# Patient Record
Sex: MALE | Race: WHITE
[De-identification: names, ages, dates, MRNs, and addresses within clinical notes are randomized per-mention and may not be internally consistent; named-entity substitution may affect disease eponyms.]

---

## 2019-06-20 ENCOUNTER — HOSPITAL ENCOUNTER (OUTPATIENT)
Dept: HOSPITAL 33 - SDC | Age: 49
Discharge: HOME | End: 2019-06-20
Attending: SURGERY
Payer: COMMERCIAL

## 2019-06-20 VITALS — HEART RATE: 59 BPM | DIASTOLIC BLOOD PRESSURE: 62 MMHG | SYSTOLIC BLOOD PRESSURE: 142 MMHG

## 2019-06-20 VITALS — OXYGEN SATURATION: 98 %

## 2019-06-20 DIAGNOSIS — K40.90: Primary | ICD-10-CM

## 2019-06-20 DIAGNOSIS — I10: ICD-10-CM

## 2019-06-20 DIAGNOSIS — E78.00: ICD-10-CM

## 2019-06-20 PROCEDURE — 49505 PRP I/HERN INIT REDUC >5 YR: CPT

## 2019-06-20 NOTE — HP
DATE OF SURGERY:  06/20/2019 



ADMISSION DIAGNOSIS:  Left inguinal hernia. 



ANTICIPATED PROCEDURE:  Repair.



HISTORY OF PRESENT ILLNESS:  The patient has moderate sized symptomatic left inguinal 
hernia.  



PAST MEDICAL HISTORY: 

ALLERGIES: NONE.

MEDICATIONS: Metoprolol, atorvastatin. 



PAST SURGICAL HISTORY:  Eardrum replacement. 



SOCIAL HISTORY: Negative.  

FAMILY HISTORY: Negative.



REVIEW OF SYSTEMS:  Hypertension, cholesterol. 



PHYSICAL EXAMINATION:  

VITAL SIGNS: Normal.

CHEST: Clear.

COR: Regular.

ABDOMEN: Left inguinal hernia. 



IMPRESSION: Symptomatic left inguinal hernia. 



PLAN:  Repair.

## 2019-06-21 NOTE — OP
SURGERY DATE/TIME:  06/20/2019  1334      



PREOPERATIVE DIAGNOSIS:     Symptomatic left inguinal hernia. 



POSTOPERATIVE DIAGNOSIS:    Symptomatic left inguinal hernia. 



PROCEDURE:    Left inguinal herniorrhaphy with mesh. 



SURGEON:        August Hagen M.D.



ANESTHESIA:    General.



COMPLICATIONS:    None.



CONDITION:        Stable.



INDICATION:  The patient with symptomatic left inguinal hernia. 



DESCRIPTION OF PROCEDURE: Taken to surgery. General anesthetic. Routine prep and drape. 
Kidney rest was up. 0.25% Marcaine. Time out performed. Curvilinear incision.  External 
oblique opened. A 3 inch indirect hernia sac. There was incarcerated omentum. A band was 
cut. The omentum reduced. High ligation of sac with 0 Prolene under direct visualization. 
The floor was reinforced with 1 x 4 mesh Judson's ligament type fashion with 0 Prolene 
making sure not to entrap any underlying nerve fibers. Internal ring was one clamp tight. 
Cord, ilioinguinal nerve laid back in natural position. External oblique closed with 0 
Vicryl.  Risa fascia closed with 2-0 Vicryl. Skin closed 4-0 Vicryl. Sterile dressing 
applied. The patient tolerated the procedure satisfactorily.

## 2020-10-26 ENCOUNTER — HOSPITAL ENCOUNTER (EMERGENCY)
Dept: HOSPITAL 33 - ED | Age: 50
Discharge: HOME | End: 2020-10-26
Payer: COMMERCIAL

## 2020-10-26 VITALS — HEART RATE: 84 BPM | SYSTOLIC BLOOD PRESSURE: 103 MMHG | DIASTOLIC BLOOD PRESSURE: 68 MMHG

## 2020-10-26 VITALS — OXYGEN SATURATION: 98 %

## 2020-10-26 DIAGNOSIS — R06.00: ICD-10-CM

## 2020-10-26 DIAGNOSIS — L50.9: Primary | ICD-10-CM

## 2020-10-26 LAB
ALBUMIN SERPL-MCNC: 3.9 G/DL (ref 3.5–5)
ALP SERPL-CCNC: 92 U/L (ref 38–126)
ALT SERPL-CCNC: 26 U/L (ref 0–50)
ANION GAP SERPL CALC-SCNC: 9.4 MEQ/L (ref 5–15)
AST SERPL QL: 26 U/L (ref 17–59)
BASOPHILS # BLD AUTO: 0 10*3/UL (ref 0–0.4)
BASOPHILS NFR BLD AUTO: 0 % (ref 0–0.4)
BILIRUB BLD-MCNC: 1.2 MG/DL (ref 0.2–1.3)
BNP SERPL-MCNC: 21.6 PG/ML (ref 0–900)
BUN SERPL-MCNC: 15 MG/DL (ref 9–20)
CALCIUM SPEC-MCNC: 8.3 MG/DL (ref 8.4–10.2)
CHLORIDE SERPL-SCNC: 109 MMOL/L (ref 98–107)
CO2 SERPL-SCNC: 24 MMOL/L (ref 22–30)
CREAT SERPL-MCNC: 0.74 MG/DL (ref 0.66–1.25)
EOSINOPHIL # BLD AUTO: 0.32 10*3/UL (ref 0–0.5)
GFR SERPLBLD BASED ON 1.73 SQ M-ARVRAT: > 60 ML/MIN
GLUCOSE SERPL-MCNC: 153 MG/DL (ref 74–106)
HCT VFR BLD AUTO: 43.5 % (ref 42–50)
HGB BLD-MCNC: 14.4 GM/DL (ref 12.5–18)
LYMPHOCYTES # SPEC AUTO: 1.7 10*3/UL (ref 1–4.6)
MAGNESIUM SERPL-MCNC: 1.9 MG/DL (ref 1.6–2.3)
MCH RBC QN AUTO: 30.4 PG (ref 26–32)
MCHC RBC AUTO-ENTMCNC: 33.1 G/DL (ref 32–36)
MONOCYTES # BLD AUTO: 0.61 10*3/UL (ref 0–1.3)
PLATELET # BLD AUTO: 205 K/MM3 (ref 150–450)
POTASSIUM SERPLBLD-SCNC: 3.5 MMOL/L (ref 3.5–5.1)
PROT SERPL-MCNC: 6.5 G/DL (ref 6.3–8.2)
RBC # BLD AUTO: 4.73 M/MM3 (ref 4.1–5.6)
SODIUM SERPL-SCNC: 139 MMOL/L (ref 137–145)
WBC # BLD AUTO: 8.8 K/MM3 (ref 4–10.5)

## 2020-10-26 PROCEDURE — 85379 FIBRIN DEGRADATION QUANT: CPT

## 2020-10-26 PROCEDURE — 93041 RHYTHM ECG TRACING: CPT

## 2020-10-26 PROCEDURE — 71046 X-RAY EXAM CHEST 2 VIEWS: CPT

## 2020-10-26 PROCEDURE — 83735 ASSAY OF MAGNESIUM: CPT

## 2020-10-26 PROCEDURE — 83880 ASSAY OF NATRIURETIC PEPTIDE: CPT

## 2020-10-26 PROCEDURE — 85025 COMPLETE CBC W/AUTO DIFF WBC: CPT

## 2020-10-26 PROCEDURE — 93005 ELECTROCARDIOGRAM TRACING: CPT

## 2020-10-26 PROCEDURE — 94760 N-INVAS EAR/PLS OXIMETRY 1: CPT

## 2020-10-26 PROCEDURE — 80053 COMPREHEN METABOLIC PANEL: CPT

## 2020-10-26 PROCEDURE — 84484 ASSAY OF TROPONIN QUANT: CPT

## 2020-10-26 PROCEDURE — 36415 COLL VENOUS BLD VENIPUNCTURE: CPT

## 2020-10-26 PROCEDURE — 71260 CT THORAX DX C+: CPT

## 2020-10-26 PROCEDURE — 99284 EMERGENCY DEPT VISIT MOD MDM: CPT

## 2020-10-26 NOTE — XRAY
Indication: Allergic reaction.  Hives.  Difficulty breathing.



Comparison: None



PA/lateral chest demonstrates normal heart, lungs, and bony thorax. 12-Feb-2018

## 2020-10-26 NOTE — ERPHSYRPT
- History of Present Illness


Time Seen by Provider: 10/26/20 00:06


Source: patient


Exam Limitations: no limitations


Physician History: 





About 1 hour ago pt started itching and erupted in hives all over with shortness

of air. Pt denies chest pain, vomiting, abdominal pain, nausea. EMS states they 

gave 125mg solumedrol IV.


Allergies/Adverse Reactions: 








No Known Drug Allergies Allergy (Unverified 06/20/19 11:20)


   





Home Medications: 








Aspirin EC 81 mg*** [Ecotrin 81 mg***] 81 mg PO DAILY 06/20/19 [History]


Atorvastatin Calcium [Lipitor] 10 mg PO DAILY 06/20/19 [History]


Metoprolol Succinate 50 mg*** [Toprol Xl 50 MG***] 50 mg PO DAILY 06/20/19 

[History]








- Review of Systems


Constitutional: No Fever


Respiratory: Dyspnea


Cardiac: No Chest Pain


Abdominal/Gastrointestinal: No Abdominal Pain, No Nausea, No Vomiting


Musculoskeletal: No Neck Pain


Skin: Rash (hives about 1 hour ago.)


Neurological: No Headache


All Other Systems: Reviewed and Negative





- Past Medical History


Pertinent Past Medical History: Yes


Neurological History: No Pertinent History


ENT History: No Pertinent History


Cardiac History: High Cholesterol, Hypertension


Respiratory History: No Pertinent History


Endocrine Medical History: Other


Musculoskeletal History: No Pertinent History


GI Medical History: Hernia


 History: No Pertinent History


Psycho-Social History: No Pertinent History


Male Reproductive Disorders: No Pertinent History


Other Medical History: pre-diabetic- diet controlled.  " extra vein in his hear

t"





- Past Surgical History


Past Surgical History: Yes


Neuro Surgical History: No Pertinent History


Cardiac: No Pertinent History


Respiratory: No Pertinent History


Gastrointestinal: No Pertinent History


Genitourinary: No Pertinent History


Musculoskeletal: No Pertinent History


Male Surgical History: Vasectomy


Other Surgical History: ear drum replacement left side





- Social History


Smoking Status: Never smoker


Exposure to second hand smoke: No


Drug Use: none





- Nursing Vital Signs


Nursing Vital Signs: 


                               Initial Vital Signs











Pulse Rate  102 H  10/26/20 00:09


 


Respiratory Rate  18   10/26/20 00:09


 


Blood Pressure  154/93   10/26/20 00:09


 


O2 Sat by Pulse Oximetry  98   10/26/20 00:09








                                   Pain Scale











Pain Intensity                 0

















- Physical Exam


General Appearance: alert


Eye Exam: PERRL/EOMI


Ears, Nose, Throat Exam: pharynx normal, other (no pharyngeal edema)


Neck Exam: normal inspection


Respiratory Exam: normal breath sounds


Cardiovascular Exam: normal heart sounds


Gastrointestinal/Abdomen Exam: soft, normal bowel sounds


Back Exam: normal inspection


Extremity Exam: No pedal edema


Neurologic Exam: alert, cooperative


Skin Exam: rash (hives over extremities, chest & abdomen)


**SpO2 Interpretation**: normal


SpO2: 98


O2 Delivery: Room Air





- Course


Nursing assessment & vital signs reviewed: Yes


EKG Interpreted by Me: RATE ([id number]), Sinus Rhythm, NORMAL AXIS, NORMAL INTERVALS





- CT Exams


  ** Chest


CT Interpretation: Tele-radiologist Report (Within limitations of the study, no 

pulmonary emboli.)


Ordered Tests: 


                               Active Orders 24 hr











 Category Date Time Status


 


 Cardiac Monitor STAT Care  10/26/20 00:17 Active


 


 EKG-ER Only STAT Care  10/26/20 00:17 Active


 


 IV Insertion STAT Care  10/26/20 00:17 Active


 


 Pulse Oximetry (ED) STAT Care  10/26/20 00:17 Active


 


 CHEST 2 VIEWS (PA AND LAT) Stat Exams  10/26/20 00:19 Taken


 


 CHEST WITH CONTRAST [CT] Stat Exams  10/26/20 01:25 Taken


 


 CBC W DIFF Stat Lab  10/26/20 00:40 Completed


 


 CMP Stat Lab  10/26/20 00:40 Completed


 


 D-DIMER QUANTITATIVE Stat Lab  10/26/20 00:40 Completed


 


 MAGNESIUM Stat Lab  10/26/20 00:40 Completed


 


 NT PRO BNP Stat Lab  10/26/20 00:40 Completed


 


 TROPONIN Q3H Lab  10/26/20 00:40 Completed


 


 TROPONIN Q3H Lab  10/26/20 03:30 Ordered


 


 TROPONIN Q3H Lab  10/26/20 06:30 Ordered


 


 TROPONIN Q3H Lab  10/26/20 09:30 Ordered


 


 TROPONIN Q3H Lab  10/26/20 12:30 Ordered








Medication Summary














Discontinued Medications














Generic Name Dose Route Start Last Admin





  Trade Name Freq  PRN Reason Stop Dose Admin


 


Lorazepam  1 mg  10/26/20 00:17  10/26/20 00:21





  Ativan 1 Mg***  PO  10/26/20 00:18  1 mg





  STAT ONE   Administration


 


Lorazepam  Confirm  10/26/20 00:20 





  Ativan 1 Mg***  Administered  10/26/20 00:21 





  Dose  





  1 mg  





  .ROUTE  





  .[employer]  











Lab/Rad Data: 


                           Laboratory Result Diagrams





                                 10/26/20 00:40 





                                 10/26/20 00:40 





                               Laboratory Results











  10/26/20 10/26/20 10/26/20 Range/Units





  00:40 00:40 00:40 


 


WBC     (4.0-10.5)  K/mm3


 


RBC     (4.1-5.6)  M/mm3


 


Hgb     (12.5-18.0)  gm/dl


 


Hct     (42-50)  %


 


MCV     ()  fl


 


MCH     (26-32)  pg


 


MCHC     (32-36)  g/dl


 


RDW     (11.5-14.0)  %


 


Plt Count     (150-450)  K/mm3


 


MPV     (7.5-11.0)  fl


 


Gran %     (36.0-66.0)  %


 


Eos # (Auto)     (0-0.5)  


 


Absolute Lymphs (auto)     (1.0-4.6)  


 


Absolute Monos (auto)     (0.0-1.3)  


 


Lymphocytes %     (24.0-44.0)  %


 


Monocytes %     (0.0-12.0)  %


 


Eosinophils %     (0.00-5.0)  %


 


Basophils %     (0.0-0.4)  %


 


Absolute Granulocytes     (1.4-6.9)  


 


Basophils #     (0-0.4)  


 


D-Dimer   730 H*   (215-500)  ng/mL


 


Sodium    139  (137-145)  mmol/L


 


Potassium    3.5  (3.5-5.1)  mmol/L


 


Chloride    109 H  ()  mmol/L


 


Carbon Dioxide    24  (22-30)  mmol/L


 


Anion Gap    9.4  (5-15)  MEQ/L


 


BUN    15  (9-20)  mg/dL


 


Creatinine    0.74  (0.66-1.25)  mg/dL


 


Estimated GFR    > 60.0  ML/MIN


 


Glucose    153 H  ()  mg/dL


 


Calcium    8.3 L  (8.4-10.2)  mg/dL


 


Magnesium    1.9  (1.6-2.3)  mg/dL


 


Total Bilirubin    1.20  (0.2-1.3)  mg/dL


 


AST    26  (17-59)  U/L


 


ALT    26  (0-50)  U/L


 


Alkaline Phosphatase    92  ()  U/L


 


Troponin I  < 0.012    (0.000-0.034)  ng/mL


 


NT-Pro-B Natriuret Pep    21.6  (0-900)  pg/mL


 


Serum Total Protein    6.5  (6.3-8.2)  g/dL


 


Albumin    3.9  (3.5-5.0)  g/dL














  10/26/20 Range/Units





  00:40 


 


WBC  8.8  (4.0-10.5)  K/mm3


 


RBC  4.73  (4.1-5.6)  M/mm3


 


Hgb  14.4  (12.5-18.0)  gm/dl


 


Hct  43.5  (42-50)  %


 


MCV  92.0  ()  fl


 


MCH  30.4  (26-32)  pg


 


MCHC  33.1  (32-36)  g/dl


 


RDW  12.9  (11.5-14.0)  %


 


Plt Count  205  (150-450)  K/mm3


 


MPV  10.6  (7.5-11.0)  fl


 


Gran %  70.2 H  (36.0-66.0)  %


 


Eos # (Auto)  0.32  (0-0.5)  


 


Absolute Lymphs (auto)  1.70  (1.0-4.6)  


 


Absolute Monos (auto)  0.61  (0.0-1.3)  


 


Lymphocytes %  19.3 L  (24.0-44.0)  %


 


Monocytes %  6.9  (0.0-12.0)  %


 


Eosinophils %  3.6  (0.00-5.0)  %


 


Basophils %  0.0  (0.0-0.4)  %


 


Absolute Granulocytes  6.20  (1.4-6.9)  


 


Basophils #  0  (0-0.4)  


 


D-Dimer   (215-500)  ng/mL


 


Sodium   (137-145)  mmol/L


 


Potassium   (3.5-5.1)  mmol/L


 


Chloride   ()  mmol/L


 


Carbon Dioxide   (22-30)  mmol/L


 


Anion Gap   (5-15)  MEQ/L


 


BUN   (9-20)  mg/dL


 


Creatinine   (0.66-1.25)  mg/dL


 


Estimated GFR   ML/MIN


 


Glucose   ()  mg/dL


 


Calcium   (8.4-10.2)  mg/dL


 


Magnesium   (1.6-2.3)  mg/dL


 


Total Bilirubin   (0.2-1.3)  mg/dL


 


AST   (17-59)  U/L


 


ALT   (0-50)  U/L


 


Alkaline Phosphatase   ()  U/L


 


Troponin I   (0.000-0.034)  ng/mL


 


NT-Pro-B Natriuret Pep   (0-900)  pg/mL


 


Serum Total Protein   (6.3-8.2)  g/dL


 


Albumin   (3.5-5.0)  g/dL














- Progress


Progress: improved


Counseled pt/family regarding: lab results, need for follow-up, rad results





- Departure


Departure Disposition: Home


Clinical Impression: 


 Hives, Dyspnea





Condition: Stable


Critical Care Time: No


Referrals: 


MARILEE JO MD [Primary Care Provider] - 


Instructions:  Hives (DC), Shortness of Breath (Dyspnea) (DC)


Additional Instructions: 


Follow up with private doctor today.


Forms:  Work/School Release Form


Prescriptions: 


Hydroxyzine HCl 25 mg*** [Atarax 25 mg***] 50 mg PO Q4H PRN PRN #30 tablet


 PRN Reason: Itching


Methylprednisolone Packet*** [Medrol Dosepack***] 4 mg PO UD #30 packet

## 2020-10-26 NOTE — XRAY
Indication: Allergic reaction.  Difficulty breathing.  Elevated d-dimer.



Multiple contiguous axial images obtained through the chest using 80 cc Isovue

370 contrast and PE protocol.



Comparison: None.



There is satisfactory opacification of the pulmonary arteries to include the

lobar and segmental branches.  No pulmonary embolus.  Heart is not enlarged.

Aorta is normal in course and caliber.  No pathologic mediastinal/hilar

lymphadenopathy.  Distal esophagus demonstrates circumferential wall

thickening, possible esophagitis.



Lungs demonstrates moderate bilateral dependent atelectasis.  No suspicious

pulmonary mass, infiltrate, consolidation, or effusion.  Bony thorax intact.



Limited upper abdomen demonstrates 1 cm calcified splenic artery aneurysm.



Impression:

1.  Negative pulmonary embolus.  No acute cardiopulmonary abnormalities.

2.  Distal esophageal circumferential wall thickening.  Rule out esophagitis.

3.  Incidental small calcified splenic artery aneurysm.



Comment: Preliminary interpretation was made by [facility].  No critical discrepancy.

## 2023-10-29 ENCOUNTER — HOSPITAL ENCOUNTER (EMERGENCY)
Dept: HOSPITAL 33 - ED | Age: 53
LOS: 1 days | Discharge: HOME | End: 2023-10-30
Payer: COMMERCIAL

## 2023-10-29 DIAGNOSIS — Z79.899: ICD-10-CM

## 2023-10-29 DIAGNOSIS — T78.40XA: Primary | ICD-10-CM

## 2023-10-29 DIAGNOSIS — E78.5: ICD-10-CM

## 2023-10-29 DIAGNOSIS — L50.0: ICD-10-CM

## 2023-10-29 DIAGNOSIS — I10: ICD-10-CM

## 2023-10-29 DIAGNOSIS — Z79.52: ICD-10-CM

## 2023-10-29 PROCEDURE — 96374 THER/PROPH/DIAG INJ IV PUSH: CPT

## 2023-10-29 PROCEDURE — 96375 TX/PRO/DX INJ NEW DRUG ADDON: CPT

## 2023-10-29 PROCEDURE — 36000 PLACE NEEDLE IN VEIN: CPT

## 2023-10-29 PROCEDURE — 99284 EMERGENCY DEPT VISIT MOD MDM: CPT

## 2023-10-30 VITALS — DIASTOLIC BLOOD PRESSURE: 73 MMHG | HEART RATE: 71 BPM | SYSTOLIC BLOOD PRESSURE: 106 MMHG

## 2023-10-30 VITALS — OXYGEN SATURATION: 98 %

## 2023-10-30 VITALS — TEMPERATURE: 97.6 F

## 2023-10-30 VITALS — RESPIRATION RATE: 20 BRPM

## 2023-10-30 NOTE — ERPHSYRPT
- History of Present Illness


Time Seen by Provider: 10/30/23 00:18


Source: patient


Exam Limitations: no limitations


Physician History: 


Patient is a 53-year-old male presents to our ED for evaluation of pruritus 

hives on his lower trunk and his legs.  Patient has a history of allergy to bee 

pollen and penicillin.  Patient denies exposure to any of these sources.  

Symptoms started just prior to arrival.  Patient had similar symptoms about 5 

years ago.  No new exposures.  No difficulty breathing.  No difficulty 

swallowing.  Symptoms are mild to moderate in intensity.  No specific worsening 

or improving factors.  Patient voices no other complaints or concerns at this 

time.











Portions of this note were created with voice recognition technology.  There may

be grammatical, spelling, punctuation or sound alike errors





Timing/Duration: today


Severity: moderate


Modifying Factors: Improves With: nothing


Associated Symptoms: denies symptoms


Allergies/Adverse Reactions: 








bee pollen Allergy (Intermediate, Verified 10/30/23 00:13)


   Swelling


Penicillins Allergy (Intermediate, Verified 10/30/23 00:13)


   Nausea and Vomiting





Home Medications: 








Aspirin EC 81 mg*** [Ecotrin 81 mg***] 81 mg PO DAILY 06/20/19 [History]


Atorvastatin Calcium [Lipitor] 10 mg PO DAILY 06/20/19 [History]


Metoprolol Succinate 50 mg*** [Toprol Xl 50 MG***] 50 mg PO DAILY 06/20/19 

[History]





Hx Tetanus, Diphtheria Vaccination/Date Given: Yes


Hx Influenza Vaccination/Date Given: No


Hx Pneumococcal Vaccination/Date Given: No





- Review of Systems


Constitutional: No Symptoms, No Fever, No Chills


Eyes: No Symptoms


Ears, Nose, & Throat: No Symptoms


Respiratory: No Symptoms, No Cough, No Dyspnea


Cardiac: No Symptoms, No Chest Pain, No Edema, No Syncope


Abdominal/Gastrointestinal: No Symptoms, No Abdominal Pain, No Nausea, No 

Vomiting, No Diarrhea


Genitourinary Symptoms: No Symptoms, No Dysuria


Musculoskeletal: No Symptoms, No Back Pain, No Neck Pain


Skin: No Symptoms, No Rash


Neurological: No Symptoms, No Dizziness, No Focal Weakness, No Sensory Changes


Psychological: No Symptoms


Endocrine: No Symptoms


Hematologic/Lymphatic: No Symptoms


Immunological/Allergic: No Symptoms


All Other Systems: Reviewed and Negative





- Past Medical History


Pertinent Past Medical History: Yes


Neurological History: No Pertinent History


ENT History: No Pertinent History


Cardiac History: High Cholesterol, Hypertension


Respiratory History: No Pertinent History


Endocrine Medical History: Other


Musculoskeletal History: No Pertinent History


GI Medical History: Hernia


 History: No Pertinent History


Psycho-Social History: No Pertinent History


Male Reproductive Disorders: No Pertinent History


Other Medical History: pre-diabetic- diet controlled.  " extra vein in his 

heart"





- Past Surgical History


Past Surgical History: Yes


Neuro Surgical History: No Pertinent History


Cardiac: No Pertinent History


Respiratory: No Pertinent History


Gastrointestinal: No Pertinent History


Genitourinary: No Pertinent History


Musculoskeletal: No Pertinent History


Male Surgical History: Vasectomy


Other Surgical History: ear drum replacement left side





- Social History


Smoking Status: Never smoker


Exposure to second hand smoke: No


Drug Use: none





- Nursing Vital Signs


Nursing Vital Signs: 


                               Initial Vital Signs











Pulse Rate  77   10/30/23 00:11


 


Respiratory Rate  16   10/30/23 00:11


 


Blood Pressure  141/102   10/30/23 00:11


 


O2 Sat by Pulse Oximetry  96   10/30/23 00:11








                                   Pain Scale











Pain Intensity                 0

















- Physical Exam


General Appearance: no apparent distress, alert


Eye Exam: PERRL/EOMI, eyes nml inspection


Ears, Nose, Throat Exam: normal ENT inspection, TMs normal, pharynx normal, 

moist mucous membranes


Neck Exam: normal inspection, non-tender, supple, full range of motion


Respiratory Exam: normal breath sounds, lungs clear, airway intact, No 

respiratory distress


Cardiovascular Exam: regular rate/rhythm, normal heart sounds, normal peripheral

 pulses


Gastrointestinal/Abdomen Exam: soft, normal bowel sounds, No tenderness, No mass


Back Exam: normal inspection, normal range of motion, No CVA tenderness, No 

vertebral tenderness


Extremity Exam: normal inspection, normal range of motion, pelvis stable


Neurologic Exam: alert, oriented x 3, cooperative, normal mood/affect, nml cer

ebellar function, nml station & gait, sensation nml, No motor deficits


Skin Exam: normal color, warm, dry, No rash


Lymphatic Exam: No adenopathy


**SpO2 Interpretation**: normal


SpO2: 98


O2 Delivery: Room Air





- Course


Nursing assessment & vital signs reviewed: Yes


Ordered Tests: 


Medication Summary














Discontinued Medications














Generic Name Dose Route Start Last Admin





  Trade Name Freq  PRN Reason Stop Dose Admin


 


Methylprednisolone Sodium  0 mg  10/30/23 00:17  10/30/23 00:34





Succinate 125 mg/ Sterile  IV  10/30/23 00:18  125 mg





Water 2 ml  STAT ONE   Administration


 


Diphenhydramine HCl  50 mg  10/30/23 00:17  10/30/23 00:32





  Diphenhydramine Hcl 25 Mg Capsule  PO  10/30/23 00:18  50 mg





  STAT ONE   Administration


 


Diphenhydramine HCl  Confirm  10/30/23 00:25 





  Diphenhydramine Hcl 25 Mg Capsule  Administered  10/30/23 00:26 





  Dose  





  25 mg  





  .ROUTE  





  .STK-MED ONE  


 


Diphenhydramine HCl  Confirm  10/30/23 00:33 





  Diphenhydramine Hcl 25 Mg Capsule  Administered  10/30/23 00:34 





  Dose  





  25 mg  





  .ROUTE  





  .STK-MED ONE  


 


Famotidine  20 mg  10/30/23 00:17  10/30/23 00:34





  Famotidine 20 Mg/1 Vial  IV  10/30/23 00:18  20 mg





  STAT ONE   Administration


 


Famotidine  Confirm  10/30/23 00:25 





  Famotidine 20 Mg/1 Vial  Administered  10/30/23 00:26 





  Dose  





  20 mg  





  IV  





  .STK-MED ONE  


 


Methylprednisolone Sodium Succinate  Confirm  10/30/23 00:25 





  Methylprednis Sod Succ 125 Mg/2 Ml Vial***  Administered  10/30/23 00:26 





  Dose  





  125 mg  





  .ROUTE  





  .STK-MED ONE  


 


Sterile Water  Confirm  10/30/23 00:25 





  Water For Injection,Sterile 10 Ml Vial  Administered  10/30/23 00:26 





  Dose  





  10 ml  





  IJ  





  .STK-MED ONE  














- Progress


Progress: improved


Progress Note: 


Patient is a 53-year-old male presents to our ED for evaluation of pruritus and 

hives.  Area involvement is lower abdomen and extremities.  No airway 

compromise.  Patient received 125 mg dose of Solu-Medrol, 20 mg of famotidine as

 well as 50 mg of diphenhydramine.  Patient reassessed.  Symptoms resolved.  

Pruritus resolved.  Hives resolved.  He prescription for prednisone and 

famotidine was forwarded to patient's pharmacy.  Patient has Benadryl at home.  

Patient also received the prescription for EpiPen.  Patient agrees to follow-up 

with his primary care doctor within 48 hours for reevaluation.  Vitals are 

stable.  Patient has no other complaints.  Will discharge home.





Portions of this note were created with voice recognition technology.  There may

 be grammatical, spelling, punctuation or sound alike errors





Complexity of problems addressed is moderate, acute complicated








No critical care time








Complaints of data reviewed and analyzed is none.  Diagnosis made based on 

history and physical examination.  No specialized testing ordered





Risk of complication and or risk of morbidity/mortality of patient management is

 moderate.  A prescription for prednisone famotidine and EpiPen forwarded to 

patient's pharmacy.











Vital stable.  Time spent to discharge patient is approximately 15 minutes.  

Plan of care established for shared decision making.  No social determinants of 

health present impede follow-up.








Portions of this note were created with voice recognition technology.  There may

 be grammatical, spelling, punctuation or sound alike errors





10/30/23 02:19





Counseled pt/family regarding: diagnosis, need for follow-up





- Departure


Departure Disposition: Home


Clinical Impression: 


 Allergic reaction, Hives, Pruritus





Condition: Stable


Critical Care Time: No


Referrals: 


MARILEE JO MD [Primary Care Provider] - Follow up/PCP as directed


Additional Instructions: 


Discharge/Care Plan





CAROLA SMITH was seen on 10/30/23 in the Emergency Room. The patient was 

counseled regarding Diagnosis,Lab results, Imaging studies, need for follow up 

and when to return to the Emergency Room.





Prescriptions given:





Discharge Note





I have spoken with the patient and/or caregivers. I have explained the patient's

condition, diagnosis and treatment plan based on the information available to me

at this time. I have answered the patient's and/or caregiver's questions and 

addressed any concerns. The patient and/or caregivers have as good understanding

of the patient's diagnosis, condition and treatment plan as can be expected at 

this point. The vital signs have been stable. The patient's condition is stable 

and appropriate for discharge from the emergency department.





The patient will pursue further outpatient evaluation with the primary care 

physician or other designated or consulting physician as outlined in the 

discharge instructions. The patient and/or caregivers are agreeable to this plan

of care and follow-up instructions have been explained in detail. The patient 

and/or caregivers have received these instruction. The patient/and or caregivers

are aware that any significant change in condition or worsening of symptoms 

should prompt an immediate return to this or the closest emergency department or

call 911. 








Prescriptions: 


Prednisone 10 mg*** [Deltasone 10 mg***] 40 mg PO DAILY 3 Days #12 tablet


EPINEPHrine [Epipen 2-Vicente***] 0.3 mg IM DAILY PRN 1 Days #1 unit


 PRN Reason: Allergies


Famotidine 20 mg*** [Pepcid 20 MG***] 20 mg PO BID 7 Days #14 tablet